# Patient Record
Sex: FEMALE | Race: WHITE | NOT HISPANIC OR LATINO | URBAN - METROPOLITAN AREA
[De-identification: names, ages, dates, MRNs, and addresses within clinical notes are randomized per-mention and may not be internally consistent; named-entity substitution may affect disease eponyms.]

---

## 2017-06-09 ENCOUNTER — IMPORTED ENCOUNTER (OUTPATIENT)
Dept: URBAN - METROPOLITAN AREA CLINIC 1 | Facility: CLINIC | Age: 67
End: 2017-06-09

## 2017-06-09 PROBLEM — H16.143: Noted: 2017-06-09

## 2017-06-09 PROBLEM — H25.813: Noted: 2017-06-09

## 2017-06-09 PROBLEM — H43.811: Noted: 2017-06-09

## 2017-06-09 PROBLEM — H04.123: Noted: 2017-06-09

## 2017-06-09 PROCEDURE — 92015 DETERMINE REFRACTIVE STATE: CPT

## 2017-06-09 PROCEDURE — 92014 COMPRE OPH EXAM EST PT 1/>: CPT

## 2017-06-09 NOTE — PATIENT DISCUSSION
1.  Cataract OU:  Visually Significant secondary to glare discussed the risks benefits alternatives and limitations of cataract surgery. The patient stated a full understanding and a desire to proceed with the procedure. The patient will need to return for preop appointment with cataract measurements and to have any additional questions answered and start pre-operative eye drops as directed. Phaco PCL OS then ODOtherwise follow-up in 1 year for a 30/glare2. YEVGENIY w/ PEK OU- Stable. The continuation of artificial tears were recommended. 3.  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 RD precautions. 4.  Return for an appointment for Ascernestina H&P with Dr. Feng Garcia.

## 2017-08-01 ENCOUNTER — IMPORTED ENCOUNTER (OUTPATIENT)
Dept: URBAN - METROPOLITAN AREA CLINIC 1 | Facility: CLINIC | Age: 67
End: 2017-08-01

## 2017-08-01 PROBLEM — H25.812: Noted: 2017-08-01

## 2017-08-01 PROCEDURE — 92136 OPHTHALMIC BIOMETRY: CPT

## 2017-08-01 NOTE — PATIENT DISCUSSION
Cataract OS: Visually Significant secondary to glare discussed the risks benefits alternatives and limitations of cataract surgery. The patient stated a full understanding and a desire to proceed with the procedure. The patient will need to return for preop appointment with cataract measurements and to have any additional questions answered and start pre-operative eye drops as directed. Phaco PCL OS Return for an appointment in [address] [place] [address] with Dr. Feng Garcia.

## 2017-08-16 ENCOUNTER — IMPORTED ENCOUNTER (OUTPATIENT)
Dept: URBAN - METROPOLITAN AREA CLINIC 1 | Facility: CLINIC | Age: 67
End: 2017-08-16

## 2017-08-17 ENCOUNTER — IMPORTED ENCOUNTER (OUTPATIENT)
Dept: URBAN - METROPOLITAN AREA CLINIC 1 | Facility: CLINIC | Age: 67
End: 2017-08-17

## 2017-08-17 PROBLEM — Z96.1: Noted: 2017-08-17

## 2017-08-17 PROCEDURE — 99024 POSTOP FOLLOW-UP VISIT: CPT

## 2017-08-17 NOTE — PATIENT DISCUSSION
POD#1 CE/IOL OS (Symfony- ZXROO) doing well. Continue all 3 gtts as prescribed and until gone. Use Durezol BID OS Ilevro Qdaily OS Ocuflox TID OS Post op Warnings Reiterated RTC as scheduled

## 2017-08-22 ENCOUNTER — IMPORTED ENCOUNTER (OUTPATIENT)
Dept: URBAN - METROPOLITAN AREA CLINIC 1 | Facility: CLINIC | Age: 67
End: 2017-08-22

## 2017-08-22 PROCEDURE — 92136 OPHTHALMIC BIOMETRY: CPT

## 2017-08-22 NOTE — PATIENT DISCUSSION
1.  Cataract OD: Visually Significant secondary to glare discussed the risks benefits alternatives and limitations of cataract surgery. The patient stated a full understanding and a desire to proceed with the procedure. Discussed with patient and patient understands halos around lights and glare are expected post-op particularly at night with the MF lens choice. Pt understood. Phaco PCL OD; Symfony 2. POW#1  CE/IOL OS (Symfony- ZXROO) doing well.   Use Durezol BID OS till out Use Ilevro Qdaily OS till out F/u as scheduled for 2nd eye

## 2017-08-25 PROBLEM — H25.811: Noted: 2017-08-25

## 2017-08-25 PROBLEM — Z96.1: Noted: 2017-08-25

## 2017-08-30 ENCOUNTER — IMPORTED ENCOUNTER (OUTPATIENT)
Dept: URBAN - METROPOLITAN AREA CLINIC 1 | Facility: CLINIC | Age: 67
End: 2017-08-30

## 2017-08-31 ENCOUNTER — IMPORTED ENCOUNTER (OUTPATIENT)
Dept: URBAN - METROPOLITAN AREA CLINIC 1 | Facility: CLINIC | Age: 67
End: 2017-08-31

## 2017-08-31 PROBLEM — Z96.1: Noted: 2017-08-31

## 2017-08-31 PROCEDURE — 99024 POSTOP FOLLOW-UP VISIT: CPT

## 2017-08-31 NOTE — PATIENT DISCUSSION
1. POD#1 CE/IOL OD (Symfony ZXROO) doing well. Continue all 3 gtts as prescribed and until gone. Durezol BID OD Ilevro Qdaily OD Ocuflox TID OD Post op Warnings Reiterated RTC as scheduled 2. POW#2  CE/IOL OS (Symfony ZXROO) doing well.   Discontinue OcufloxUse Durezol BID OS till out Use Ilevro Qdaily OS till out

## 2017-09-21 ENCOUNTER — IMPORTED ENCOUNTER (OUTPATIENT)
Dept: URBAN - METROPOLITAN AREA CLINIC 1 | Facility: CLINIC | Age: 67
End: 2017-09-21

## 2017-09-21 PROBLEM — Z96.1: Noted: 2017-09-21

## 2017-09-21 PROCEDURE — 99024 POSTOP FOLLOW-UP VISIT: CPT

## 2017-09-21 NOTE — PATIENT DISCUSSION
POW#3 Phaco/ PCL OU (Symfony ZXROO OU) Doing well. [facility] Excellent. Finish PO meds per schedule Can use OTC reader 1.25 PRN Return for an appointment in June 30 with Dr. Claudeen Cobble.

## 2018-01-19 ENCOUNTER — IMPORTED ENCOUNTER (OUTPATIENT)
Dept: URBAN - METROPOLITAN AREA CLINIC 1 | Facility: CLINIC | Age: 68
End: 2018-01-19

## 2018-01-19 PROBLEM — H04.123: Noted: 2018-01-19

## 2018-01-19 PROBLEM — Z96.1: Noted: 2018-01-19

## 2018-01-19 PROBLEM — H26.493: Noted: 2018-01-19

## 2018-01-19 PROBLEM — H16.143: Noted: 2018-01-19

## 2018-01-19 PROCEDURE — 92012 INTRM OPH EXAM EST PATIENT: CPT

## 2018-01-19 NOTE — PATIENT DISCUSSION
1.  YEVGENIY w/ PEK OU- The increase of artificial tears OU to TID were recommended. 2.  PCO OU: (Posterior Capsule Opacification)   Observe and consider yag cap when pt feels pco visually significant and visual acuity decreases to appropriate level. 3. Pseudophakia OU (Symfony ZXROO)- Doing well. 4. H/o PVD OD5. Return as scheduled for a 30/glare with Dr. Samantha Vargas.

## 2018-06-19 ENCOUNTER — IMPORTED ENCOUNTER (OUTPATIENT)
Dept: URBAN - METROPOLITAN AREA CLINIC 1 | Facility: CLINIC | Age: 68
End: 2018-06-19

## 2018-06-19 PROBLEM — Z96.1: Noted: 2018-06-19

## 2018-06-19 PROBLEM — H04.123: Noted: 2018-06-19

## 2018-06-19 PROBLEM — H16.143: Noted: 2018-06-19

## 2018-06-19 PROBLEM — H26.493: Noted: 2018-06-19

## 2018-06-19 PROCEDURE — 92014 COMPRE OPH EXAM EST PT 1/>: CPT

## 2018-06-19 NOTE — PATIENT DISCUSSION
1.  YEVGENIY w/ PEK OU-The continuation of artificial tears were recommended. 2.  PCO OU: (Posterior Capsule Opacification)   Observe and consider yag cap when pt feels pco visually significant and visual acuity decreases to appropriate level. 3. Pseudophakia OU - Symfony OU4. Return for an appointment for 6mo/DFE glare check pco with Dr. Ester Jovel.

## 2018-12-11 ENCOUNTER — IMPORTED ENCOUNTER (OUTPATIENT)
Dept: URBAN - METROPOLITAN AREA CLINIC 1 | Facility: CLINIC | Age: 68
End: 2018-12-11

## 2018-12-11 PROBLEM — H16.143: Noted: 2018-12-11

## 2018-12-11 PROBLEM — Z96.1: Noted: 2018-12-11

## 2018-12-11 PROBLEM — H26.493: Noted: 2018-12-11

## 2018-12-11 PROBLEM — H04.123: Noted: 2018-12-11

## 2018-12-11 PROCEDURE — 92012 INTRM OPH EXAM EST PATIENT: CPT

## 2018-12-11 PROCEDURE — 92015 DETERMINE REFRACTIVE STATE: CPT

## 2018-12-11 NOTE — PATIENT DISCUSSION
1.  PCO OU- Visually Significant *secondary to glare*; schedule YAG Cap. Pros cons risks and benefits. 2.  YEVGENIY w/ PEK OU- No improvement. The increase of artificial tears OU to TID were recommended routinely. 3.  Pseudophakia OU- Symfony MF OU- Doing well.

## 2019-01-04 ENCOUNTER — IMPORTED ENCOUNTER (OUTPATIENT)
Dept: URBAN - METROPOLITAN AREA CLINIC 1 | Facility: CLINIC | Age: 69
End: 2019-01-04

## 2019-01-04 PROBLEM — H26.492: Noted: 2019-01-04

## 2019-01-04 PROCEDURE — 66821 AFTER CATARACT LASER SURGERY: CPT

## 2019-01-04 NOTE — PATIENT DISCUSSION
YAG CAP OS: (Consent signed and scanned into attachments) 1 gtt Prolensa applied. The purpose and nature of the procedure possible alternative methods of treatment the risks involved and the possibility of complications were discussed with patient. The Patient wishes to proceed and the consent was signed. The laser was then performed under topical anesthesia with no complications. Post op instructions were given to patient as well as a follow-up appointment. Patient was advised to call our office if any questions or concerns.  RTC as scheduled

## 2019-01-18 ENCOUNTER — IMPORTED ENCOUNTER (OUTPATIENT)
Dept: URBAN - METROPOLITAN AREA CLINIC 1 | Facility: CLINIC | Age: 69
End: 2019-01-18

## 2019-01-18 PROBLEM — H26.491: Noted: 2019-01-18

## 2019-01-18 PROBLEM — H26.492: Noted: 2019-01-18

## 2019-01-18 PROCEDURE — 66821 AFTER CATARACT LASER SURGERY: CPT

## 2019-01-18 NOTE — PATIENT DISCUSSION
YAG CAP OD: (Consent signed and scanned into attachments) 1 gtt Prolensa applied. The purpose and nature of the procedure possible alternative methods of treatment the risks involved and the possibility of complications were discussed with patient. The Patient wishes to proceed and the consent was signed. The laser was then performed under topical anesthesia with no complications. Post op instructions were given to patient as well as a follow-up appointment. Patient was advised to call our office if any questions or concerns. Return for an appointment in 1-6 week po with Dr. Claudeen Cobble.

## 2019-01-18 NOTE — PATIENT DISCUSSION
YAG CAP OS: (Consent signed and scanned into attachments) 1 gtt Prolensa applied. The purpose and nature of the procedure possible alternative methods of treatment the risks involved and the possibility of complications were discussed with patient. The Patient wishes to proceed and the consent was signed. The laser was then performed under topical anesthesia with no complications. Post op instructions were given to patient as well as a follow-up appointment. Patient was advised to call our office if any questions or concerns.

## 2019-01-22 ENCOUNTER — IMPORTED ENCOUNTER (OUTPATIENT)
Dept: URBAN - METROPOLITAN AREA CLINIC 1 | Facility: CLINIC | Age: 69
End: 2019-01-22

## 2019-01-22 PROCEDURE — 99024 POSTOP FOLLOW-UP VISIT: CPT

## 2019-01-22 NOTE — PATIENT DISCUSSION
PO YAG Cap OU: good result. MRX given. Return for an appointment for 30 in 6 months with Dr. Taylor Lee.

## 2019-07-23 ENCOUNTER — IMPORTED ENCOUNTER (OUTPATIENT)
Dept: URBAN - METROPOLITAN AREA CLINIC 1 | Facility: CLINIC | Age: 69
End: 2019-07-23

## 2019-07-23 PROBLEM — Z96.1: Noted: 2019-07-23

## 2019-07-23 PROBLEM — H16.143: Noted: 2019-07-23

## 2019-07-23 PROBLEM — H04.123: Noted: 2019-07-23

## 2019-07-23 PROCEDURE — 92014 COMPRE OPH EXAM EST PT 1/>: CPT

## 2019-07-23 NOTE — PATIENT DISCUSSION
1.  YEVGENIY w/ PEK OU- Cont ATs TID OU routinely. 2.  Pseudophakia OU - (Symfony MF ZXROO OU) H/o YAG Cap OU Letter to PCP MRx deferred Return for an appointment in 1 yr 27 with Dr. Sammy Mccoy.

## 2020-07-21 ENCOUNTER — IMPORTED ENCOUNTER (OUTPATIENT)
Dept: URBAN - METROPOLITAN AREA CLINIC 1 | Facility: CLINIC | Age: 70
End: 2020-07-21

## 2020-07-21 PROBLEM — H43.811: Noted: 2020-07-21

## 2020-07-21 PROBLEM — Z96.1: Noted: 2020-07-21

## 2020-07-21 PROBLEM — H16.143: Noted: 2020-07-21

## 2020-07-21 PROBLEM — H04.123: Noted: 2020-07-21

## 2020-07-21 PROCEDURE — 92014 COMPRE OPH EXAM EST PT 1/>: CPT

## 2020-07-21 NOTE — PATIENT DISCUSSION
1.  YEVGENIY w/ PEK OU -- Cont ATs TID OU routinely. 2.  Pseudophakia OU -- (Symfony MF ZXROO OU) H/o YAG Cap OU. 3. PVD OD -- Stable. RD Precautions. Letter to PCP MRx deferred Return for an appointment in 1 year 27 with Dr. Jorje Baltazar.

## 2021-07-20 ENCOUNTER — IMPORTED ENCOUNTER (OUTPATIENT)
Dept: URBAN - METROPOLITAN AREA CLINIC 1 | Facility: CLINIC | Age: 71
End: 2021-07-20

## 2021-07-20 PROBLEM — H43.811: Noted: 2021-07-20

## 2021-07-20 PROBLEM — H16.143: Noted: 2021-07-20

## 2021-07-20 PROBLEM — Z96.1: Noted: 2021-07-20

## 2021-07-20 PROBLEM — H04.123: Noted: 2021-07-20

## 2021-07-20 PROCEDURE — 99214 OFFICE O/P EST MOD 30 MIN: CPT

## 2021-07-20 NOTE — PATIENT DISCUSSION
1.  YEVGENIY w/ PEK OU- Recommend ATs TID OU routinely 2. Pseudophakia OU - (Symfony MF ZXROO OU)  H/o YAG Cap OU3. PVD w/o Tear OD - RD precautions. Patient defers the refraction at today's visitReturn for an appointment in 1 year 27 with Dr. Kelsea Solomon.

## 2022-04-02 ASSESSMENT — VISUAL ACUITY
OS_SC: J2
OS_SC: J2
OD_PH: SC 20/25
OS_CC: 20/20
OS_CC: 20/20
OD_SC: J2
OD_CC: 20/20
OD_CC: 20/20-1
OU_SC: J2
OD_SC: J2
OD_SC: J1
OD_GLARE: 20/80
OS_CC: 20/30
OS_SC: J2
OS_SC: J1+1
OD_SC: J3
OS_SC: J1
OS_CC: 20/20
OD_CC: 20/20
OS_SC: J3
OS_SC: J1
OS_CC: 20/25
OS_GLARE: 20/50
OD_SC: J2
OD_SC: 20/40
OD_SC: J1+1
OD_CC: 20/20
OD_CC: 20/20-1
OS_GLARE: 20/80
OD_CC: 20/40-1
OS_SC: 20/40-2
OD_CC: 20/20
OS_CC: 20/20
OU_CC: 20/20
OS_CC: 20/20
OD_CC: 20/25
OD_CC: 20/20
OS_CC: 20/20
OS_CC: 20/20-1
OS_CC: 20/20
OS_CC: 20/20
OD_GLARE: 20/50

## 2022-04-02 ASSESSMENT — TONOMETRY
OS_IOP_MMHG: 11
OD_IOP_MMHG: 15
OD_IOP_MMHG: 15
OS_IOP_MMHG: 17
OD_IOP_MMHG: 14
OD_IOP_MMHG: 11
OD_IOP_MMHG: 14
OD_IOP_MMHG: 14
OD_IOP_MMHG: 11
OS_IOP_MMHG: 13
OS_IOP_MMHG: 14
OS_IOP_MMHG: 15
OD_IOP_MMHG: 15
OS_IOP_MMHG: 21
OS_IOP_MMHG: 15
OS_IOP_MMHG: 12
OS_IOP_MMHG: 14
OS_IOP_MMHG: 13
OS_IOP_MMHG: 14
OD_IOP_MMHG: 13
OS_IOP_MMHG: 14
OD_IOP_MMHG: 14

## 2022-07-19 ENCOUNTER — COMPREHENSIVE EXAM (OUTPATIENT)
Dept: URBAN - METROPOLITAN AREA CLINIC 1 | Facility: CLINIC | Age: 72
End: 2022-07-19

## 2022-07-19 DIAGNOSIS — Z96.1: ICD-10-CM

## 2022-07-19 DIAGNOSIS — H43.811: ICD-10-CM

## 2022-07-19 DIAGNOSIS — H16.143: ICD-10-CM

## 2022-07-19 DIAGNOSIS — H04.123: ICD-10-CM

## 2022-07-19 PROCEDURE — 99214 OFFICE O/P EST MOD 30 MIN: CPT

## 2022-07-19 ASSESSMENT — VISUAL ACUITY
OS_SC: J1
OD_SC: 20/20
OS_SC: 20/20
OD_SC: J1

## 2022-07-19 ASSESSMENT — TONOMETRY
OD_IOP_MMHG: 11
OS_IOP_MMHG: 11